# Patient Record
Sex: MALE | Race: WHITE
[De-identification: names, ages, dates, MRNs, and addresses within clinical notes are randomized per-mention and may not be internally consistent; named-entity substitution may affect disease eponyms.]

---

## 2019-02-03 ENCOUNTER — HOSPITAL ENCOUNTER (OUTPATIENT)
Dept: HOSPITAL 92 - ERS | Age: 29
End: 2019-02-03
Attending: SURGERY
Payer: OTHER GOVERNMENT

## 2019-02-03 DIAGNOSIS — Z98.890: ICD-10-CM

## 2019-02-03 DIAGNOSIS — Z88.8: ICD-10-CM

## 2019-02-03 DIAGNOSIS — Z88.4: ICD-10-CM

## 2019-02-03 DIAGNOSIS — K35.33: Primary | ICD-10-CM

## 2019-02-03 DIAGNOSIS — G54.0: ICD-10-CM

## 2019-02-03 DIAGNOSIS — Z90.89: ICD-10-CM

## 2019-02-03 DIAGNOSIS — M72.2: ICD-10-CM

## 2019-02-03 PROCEDURE — 0DTJ4ZZ RESECTION OF APPENDIX, PERCUTANEOUS ENDOSCOPIC APPROACH: ICD-10-PCS | Performed by: SURGERY

## 2019-02-03 PROCEDURE — 96361 HYDRATE IV INFUSION ADD-ON: CPT

## 2019-02-03 PROCEDURE — 96375 TX/PRO/DX INJ NEW DRUG ADDON: CPT

## 2019-02-03 PROCEDURE — 96376 TX/PRO/DX INJ SAME DRUG ADON: CPT

## 2019-02-03 PROCEDURE — 96365 THER/PROPH/DIAG IV INF INIT: CPT

## 2019-02-03 PROCEDURE — 88304 TISSUE EXAM BY PATHOLOGIST: CPT

## 2019-02-03 NOTE — HP
CHIEF COMPLAINT:  Right lower quadrant abdominal pain.



HISTORY OF PRESENT ILLNESS:  This is a 28-year-old male with a 20-hour history of

central abdominal pain, progressive, associated with nausea and vomiting.  No fever.

 Bowels okay.  Now, some right lower quadrant pain. 



PAST MEDICAL HISTORY:  Significant for thoracic outlet syndrome, plantar fasciitis.



PAST SURGICAL HISTORY:  He had a tonsilloadenoidectomy.  He has had shockwave

therapy to his foot.  He has had some kind of orthopedic procedure to the left lower

leg. 



MEDICATIONS:  He is on Adderall.



ALLERGIES:  TO DIPRIVAN AND VERSED CAUSING SEIZURE ACTIVITY.



SOCIAL HISTORY:  He is single.  He is an .  No tobacco.  Rare

alcohol. 



FAMILY HISTORY:  Prostate cancer.



PHYSICAL EXAMINATION:

VITAL SIGNS:  He is afebrile, pulse 68, blood pressure 105/64.  Well-developed,

well-nourished male, in no apparent distress. HEENT:  Unremarkable. 

LUNGS:  Clear. 

HEART:  Regular rate and rhythm. 

ABDOMEN:  Soft.  He is tender in the epigastrium and right lower quadrant. 

EXTREMITIES:  Unremarkable.



LABORATORY DATA:  His white count is 18,000, H and H of 14 and 43, platelet count

260. 



IMAGING STUDIES:  CT scan shows acute appendicitis.



ASSESSMENT:  Acute appendicitis.



PLAN:  Laparoscopic appendectomy.



CONSENT:  I have discussed planned procedure as well as risk of bleeding, infection,

injury to bowel or bladder, need to open.  He understands and gives informed

consent. 







Job ID:  [id number]

## 2019-02-04 NOTE — OP
DATE OF PROCEDURE:  02/03/2019



PREOPERATIVE DIAGNOSIS:  Acute appendicitis.



PROCEDURE PERFORMED:  Laparoscopic appendectomy.



INDICATIONS:  This is a 28-year-old male, with less than 24-hour history of

epigastric pain, migrating to the right lower quadrant with nausea and vomiting.  CT

showing appendicitis. 



FINDINGS:  Acute suppurative nonperforated appendicitis.



DESCRIPTION OF PROCEDURE:  After informed consent was obtained, the patient was

taken to the operating room, given general endotracheal anesthesia, placed in the

supine position.  Abdomen was prepped and draped in usual fashion.  Local anesthesia

was infiltrated subcutaneously and deep, and subumbilical incision was performed.

Subcu divided sharply.  The fascia grasped and 2 stay sutures of 0 Vicryl placed in

each side of midline.  Midline incised.  Digital palpation revealed no local

adhesions.  A blunt 10/12 trocar inserted.  Pneumoperitoneum was created to a

pressure of 15 mmHg.  A 0-degree laparoscope was inserted under direct vision.  Two

5 mm ports were placed; one suprapubically and one in the right lateral abdomen.

The appendix was found.  Mesoappendix was divided with LigaSure.  Base of the

appendix was divided with the linear 45 mm white load stapler.  The appendix was

placed in endosac, removed from the abdomen in the endosac hemostasis assured.

Trocars and retractors were removed.  The fascia was closed with interrupted 0

Vicryl suture.  The skin was closed with interrupted 4-0 Rapide.  Dermabond was

applied.  The patient tolerated the procedure well, transferred to Recovery in good

condition.  Sponge and needle count verified correct x2. 







Job ID:  [id number]

## 2019-02-10 ENCOUNTER — HOSPITAL ENCOUNTER (EMERGENCY)
Dept: HOSPITAL 92 - ERS | Age: 29
Discharge: ANOTHER HOSPITAL | End: 2019-02-10
Payer: OTHER GOVERNMENT

## 2019-02-10 DIAGNOSIS — Z87.891: ICD-10-CM

## 2019-02-10 DIAGNOSIS — F41.9: ICD-10-CM

## 2019-02-10 DIAGNOSIS — F90.9: ICD-10-CM

## 2019-02-10 DIAGNOSIS — Z79.899: ICD-10-CM

## 2019-02-10 DIAGNOSIS — K52.9: Primary | ICD-10-CM

## 2019-02-10 LAB
ALBUMIN SERPL BCG-MCNC: 4.6 G/DL (ref 3.5–5)
ALP SERPL-CCNC: 59 U/L (ref 40–150)
ALT SERPL W P-5'-P-CCNC: 37 U/L (ref 8–55)
ANION GAP SERPL CALC-SCNC: 13 MMOL/L (ref 10–20)
AST SERPL-CCNC: 27 U/L (ref 5–34)
BASOPHILS # BLD AUTO: 0.1 THOU/UL (ref 0–0.2)
BASOPHILS NFR BLD AUTO: 0.6 % (ref 0–1)
BILIRUB SERPL-MCNC: 0.3 MG/DL (ref 0.2–1.2)
BUN SERPL-MCNC: 22 MG/DL (ref 8.9–20.6)
CALCIUM SERPL-MCNC: 10 MG/DL (ref 7.8–10.44)
CHLORIDE SERPL-SCNC: 105 MMOL/L (ref 98–107)
CO2 SERPL-SCNC: 28 MMOL/L (ref 22–29)
CREAT CL PREDICTED SERPL C-G-VRATE: 0 ML/MIN (ref 70–130)
EOSINOPHIL # BLD AUTO: 0.3 THOU/UL (ref 0–0.7)
EOSINOPHIL NFR BLD AUTO: 2.6 % (ref 0–10)
GLOBULIN SER CALC-MCNC: 2.9 G/DL (ref 2.4–3.5)
GLUCOSE SERPL-MCNC: 82 MG/DL (ref 70–105)
HGB BLD-MCNC: 15.2 G/DL (ref 14–18)
LIPASE SERPL-CCNC: 13 U/L (ref 8–78)
LYMPHOCYTES # BLD: 2.9 THOU/UL (ref 1.2–3.4)
LYMPHOCYTES NFR BLD AUTO: 22.3 % (ref 21–51)
MCH RBC QN AUTO: 29.7 PG (ref 27–31)
MCV RBC AUTO: 89.7 FL (ref 78–98)
MONOCYTES # BLD AUTO: 0.7 THOU/UL (ref 0.11–0.59)
MONOCYTES NFR BLD AUTO: 5.4 % (ref 0–10)
NEUTROPHILS # BLD AUTO: 8.9 THOU/UL (ref 1.4–6.5)
NEUTROPHILS NFR BLD AUTO: 69.1 % (ref 42–75)
PLATELET # BLD AUTO: 260 THOU/UL (ref 130–400)
POTASSIUM SERPL-SCNC: 3.6 MMOL/L (ref 3.5–5.1)
RBC # BLD AUTO: 5.1 MILL/UL (ref 4.7–6.1)
SODIUM SERPL-SCNC: 142 MMOL/L (ref 136–145)
SP GR UR STRIP: 1.04 (ref 1–1.04)
WBC # BLD AUTO: 12.9 THOU/UL (ref 4.8–10.8)

## 2019-02-10 PROCEDURE — 96375 TX/PRO/DX INJ NEW DRUG ADDON: CPT

## 2019-02-10 PROCEDURE — 96361 HYDRATE IV INFUSION ADD-ON: CPT

## 2019-02-10 PROCEDURE — 81003 URINALYSIS AUTO W/O SCOPE: CPT

## 2019-02-10 PROCEDURE — 96365 THER/PROPH/DIAG IV INF INIT: CPT

## 2019-02-10 PROCEDURE — 96376 TX/PRO/DX INJ SAME DRUG ADON: CPT

## 2019-02-10 PROCEDURE — 96367 TX/PROPH/DG ADDL SEQ IV INF: CPT

## 2019-02-10 PROCEDURE — 85025 COMPLETE CBC W/AUTO DIFF WBC: CPT

## 2019-02-10 PROCEDURE — 36415 COLL VENOUS BLD VENIPUNCTURE: CPT

## 2019-02-10 PROCEDURE — 83690 ASSAY OF LIPASE: CPT

## 2019-02-10 PROCEDURE — 80053 COMPREHEN METABOLIC PANEL: CPT

## 2019-02-10 PROCEDURE — 74177 CT ABD & PELVIS W/CONTRAST: CPT

## 2019-02-10 NOTE — CT
PRELIMINARY REPORT/[facility]/EMERGENTY AFTER-HOURS PROCEDURE 

 

CT Abdomen and Pelvis With Contrast

 

EXAM DATE/TIME:

2/10/2019 2:04 AM

 

CLINICAL HISTORY:

28 years old, male; Lower abd pain; Prior surgery; Patient 6 days S/P lap appendectomy. Per pt pain w
as very well controlled over the past week, only needed to take a few norcos the first few days. Pt r
eports sudden onset abdominal pain just prior to arrival associated with nausea / vomiting. He

reports chills and sweating yesterday. Denies diarrhea.

 

TECHNIQUE:

Axial computed tomography images of the abdomen and pelvis with intravenous contrast. Coronal reforma
tted images were created and reviewed.

 

COMPARISON:

No relevant prior studies available.

 

FINDINGS:

Lower thorax: No acute findings.

 

ABDOMEN:

Liver: Normal. No mass.

Gallbladder and bile ducts: Normal. No calcified stones. No ductal dilation.

Pancreas: Normal. No ductal dilation.

Spleen: Normal. No splenomegaly.

Adrenals: Normal. No mass.

Kidneys and ureters: Normal. No hydronephrosis.

Stomach and bowel: Wall thickening of the splenic flexure, descending colon, and majority of the sigm
oid colon indicating a colitis. In addition, on coronal images 37-67, multiple mildly distended, flui
d-filled, small bowel loops with minimal wall thickening which may reflect an associated enteritis.

Appendix: Prior appendectomy.

 

PELVIS:

Bladder: Unremarkable as visualized.

Reproductive: Unremarkable as visualized.

 

ABDOMEN and PELVIS:

Intraperitoneal space: Trace free fluid within the right pelvis. No intraperitoneal free air.

Bones/joints: No acute fracture. No dislocation.

Soft tissues: Small fat-containing umbilical hernia.

Vasculature: Normal. No abdominal aortic aneurysm.

Lymph nodes: Normal. No enlarged lymph nodes.

 

IMPRESSION:

1. Wall thickening of the splenic flexure, descending colon, and majority of the sigmoid colon indica
ting a colitis.

2. In addition, on coronal images 37-67, multiple mildly distended, fluid-filled, small bowel loops w
ith minimal wall thickening which may reflect an associated enteritis.

3. Prior appendectomy.

4. No intra-abdominal or pelvic abscess.

 

Thank you for allowing us to participate in the care of your patient.

Dictated and Authenticated by: Humberto Joshi MD

02/10/2019 3:11 AM Central Time ([place] & [place])

 

 

FINAL REPORT 

 

EMERGENT AFTER HOURS CT OF THE ABDOMEN AND PELVIS WITH CONTRAST:

 

FINDINGS/IMPRESSION:

The apparent thickening of the wall of the sigmoid colon and left colon may be secondary to the decom
pressed state rather than pathologic.  No other acute intraabdominal/pelvic findings are identified. 


 

POS: JOY

## 2019-09-07 ENCOUNTER — HOSPITAL ENCOUNTER (EMERGENCY)
Dept: HOSPITAL 92 - ERS | Age: 29
Discharge: HOME | End: 2019-09-07
Payer: OTHER GOVERNMENT

## 2019-09-07 DIAGNOSIS — N20.1: Primary | ICD-10-CM

## 2019-09-07 DIAGNOSIS — F17.210: ICD-10-CM

## 2019-09-07 DIAGNOSIS — Z79.899: ICD-10-CM

## 2019-09-07 DIAGNOSIS — F90.9: ICD-10-CM

## 2019-09-07 DIAGNOSIS — K21.9: ICD-10-CM

## 2019-09-07 DIAGNOSIS — F41.9: ICD-10-CM

## 2019-09-07 LAB
ALBUMIN SERPL BCG-MCNC: 4.6 G/DL (ref 3.5–5)
ALP SERPL-CCNC: 56 U/L (ref 40–150)
ALT SERPL W P-5'-P-CCNC: 14 U/L (ref 8–55)
ANION GAP SERPL CALC-SCNC: 12 MMOL/L (ref 10–20)
AST SERPL-CCNC: 14 U/L (ref 5–34)
BASOPHILS # BLD AUTO: 0 THOU/UL (ref 0–0.2)
BASOPHILS NFR BLD AUTO: 0.1 % (ref 0–1)
BILIRUB SERPL-MCNC: 0.5 MG/DL (ref 0.2–1.2)
BUN SERPL-MCNC: 18 MG/DL (ref 8.9–20.6)
CALCIUM SERPL-MCNC: 9.2 MG/DL (ref 7.8–10.44)
CHLORIDE SERPL-SCNC: 105 MMOL/L (ref 98–107)
CK SERPL-CCNC: 79 U/L (ref 30–200)
CO2 SERPL-SCNC: 23 MMOL/L (ref 22–29)
CREAT CL PREDICTED SERPL C-G-VRATE: 0 ML/MIN (ref 70–130)
EOSINOPHIL # BLD AUTO: 0 THOU/UL (ref 0–0.7)
EOSINOPHIL NFR BLD AUTO: 0.2 % (ref 0–10)
GLOBULIN SER CALC-MCNC: 2.3 G/DL (ref 2.4–3.5)
GLUCOSE SERPL-MCNC: 103 MG/DL (ref 70–105)
HGB BLD-MCNC: 14.5 G/DL (ref 14–18)
LIPASE SERPL-CCNC: 20 U/L (ref 8–78)
LYMPHOCYTES # BLD: 0.7 THOU/UL (ref 1.2–3.4)
LYMPHOCYTES NFR BLD AUTO: 5.3 % (ref 21–51)
MCH RBC QN AUTO: 30.4 PG (ref 27–31)
MCV RBC AUTO: 87.3 FL (ref 78–98)
MONOCYTES # BLD AUTO: 0.5 THOU/UL (ref 0.11–0.59)
MONOCYTES NFR BLD AUTO: 3.7 % (ref 0–10)
NEUTROPHILS # BLD AUTO: 12.4 THOU/UL (ref 1.4–6.5)
NEUTROPHILS NFR BLD AUTO: 90.7 % (ref 42–75)
PLATELET # BLD AUTO: 212 THOU/UL (ref 130–400)
POTASSIUM SERPL-SCNC: 3.9 MMOL/L (ref 3.5–5.1)
RBC # BLD AUTO: 4.76 MILL/UL (ref 4.7–6.1)
RBC UR QL AUTO: (no result) HPF (ref 0–3)
SODIUM SERPL-SCNC: 136 MMOL/L (ref 136–145)
WBC # BLD AUTO: 13.7 THOU/UL (ref 4.8–10.8)
WBC UR QL AUTO: (no result) HPF (ref 0–3)

## 2019-09-07 PROCEDURE — 82550 ASSAY OF CK (CPK): CPT

## 2019-09-07 PROCEDURE — 36415 COLL VENOUS BLD VENIPUNCTURE: CPT

## 2019-09-07 PROCEDURE — 83690 ASSAY OF LIPASE: CPT

## 2019-09-07 PROCEDURE — 85025 COMPLETE CBC W/AUTO DIFF WBC: CPT

## 2019-09-07 PROCEDURE — 81015 MICROSCOPIC EXAM OF URINE: CPT

## 2019-09-07 PROCEDURE — 83605 ASSAY OF LACTIC ACID: CPT

## 2019-09-07 PROCEDURE — 80053 COMPREHEN METABOLIC PANEL: CPT

## 2019-09-07 PROCEDURE — 96361 HYDRATE IV INFUSION ADD-ON: CPT

## 2019-09-07 PROCEDURE — 96374 THER/PROPH/DIAG INJ IV PUSH: CPT

## 2019-09-07 PROCEDURE — 74177 CT ABD & PELVIS W/CONTRAST: CPT

## 2019-09-07 PROCEDURE — 81003 URINALYSIS AUTO W/O SCOPE: CPT

## 2019-09-07 PROCEDURE — 96375 TX/PRO/DX INJ NEW DRUG ADDON: CPT

## 2019-09-07 NOTE — CT
EXAM:

CT abdomen and pelvis with IV contrast



PROVIDED CLINICAL HISTORY:

Left lower quadrant pain



COMPARISON:

2/10/2019



FINDINGS:

The visualized lung bases are free of significant opacity.



There is a 4 mm calculus present in the proximal left ureter with no significant hydronephrosis. Stab
le subcentimeter hypodensities likely reflecting cysts involving right hepatic lobe and right

kidney. The liver, spleen, pancreas, kidneys and adrenal glands appear otherwise unremarkable.



There is no bowel dilatation, inflammatory fat stranding, free fluid or free air apparent. There is n
o evidence for appendicitis.



No regional lymph node enlargement apparent. The regional major vascular structures appear unremarkab
le. The osseous structures demonstrate no concerning lytic or blastic lesions.



IMPRESSION:

4 mm nonobstructing left proximal ureteral calculus.



Reported By: Miguel Price 

Electronically Signed:  9/7/2019 1:39 PM

## 2019-09-24 ENCOUNTER — HOSPITAL ENCOUNTER (EMERGENCY)
Dept: HOSPITAL 92 - ERS | Age: 29
Discharge: HOME | End: 2019-09-24
Payer: OTHER GOVERNMENT

## 2019-09-24 DIAGNOSIS — F41.9: ICD-10-CM

## 2019-09-24 DIAGNOSIS — Z87.891: ICD-10-CM

## 2019-09-24 DIAGNOSIS — F90.9: ICD-10-CM

## 2019-09-24 DIAGNOSIS — K21.9: ICD-10-CM

## 2019-09-24 DIAGNOSIS — N13.2: Primary | ICD-10-CM

## 2019-09-24 DIAGNOSIS — Z79.899: ICD-10-CM

## 2019-09-24 LAB
ALBUMIN SERPL BCG-MCNC: 4.7 G/DL (ref 3.5–5)
ALP SERPL-CCNC: 61 U/L (ref 40–150)
ALT SERPL W P-5'-P-CCNC: 20 U/L (ref 8–55)
ANION GAP SERPL CALC-SCNC: 12 MMOL/L (ref 10–20)
AST SERPL-CCNC: 16 U/L (ref 5–34)
BASOPHILS # BLD AUTO: 0.1 THOU/UL (ref 0–0.2)
BASOPHILS NFR BLD AUTO: 0.5 % (ref 0–1)
BILIRUB SERPL-MCNC: 0.5 MG/DL (ref 0.2–1.2)
BUN SERPL-MCNC: 17 MG/DL (ref 8.9–20.6)
CALCIUM SERPL-MCNC: 9.7 MG/DL (ref 7.8–10.44)
CHLORIDE SERPL-SCNC: 103 MMOL/L (ref 98–107)
CO2 SERPL-SCNC: 25 MMOL/L (ref 22–29)
CREAT CL PREDICTED SERPL C-G-VRATE: 0 ML/MIN (ref 70–130)
EOSINOPHIL # BLD AUTO: 0.1 THOU/UL (ref 0–0.7)
EOSINOPHIL NFR BLD AUTO: 0.9 % (ref 0–10)
GLOBULIN SER CALC-MCNC: 2.5 G/DL (ref 2.4–3.5)
GLUCOSE SERPL-MCNC: 122 MG/DL (ref 70–105)
HGB BLD-MCNC: 15.3 G/DL (ref 14–18)
LYMPHOCYTES # BLD: 1.9 THOU/UL (ref 1.2–3.4)
LYMPHOCYTES NFR BLD AUTO: 17 % (ref 21–51)
MCH RBC QN AUTO: 30.4 PG (ref 27–31)
MCV RBC AUTO: 88.7 FL (ref 78–98)
MONOCYTES # BLD AUTO: 0.5 THOU/UL (ref 0.11–0.59)
MONOCYTES NFR BLD AUTO: 4.8 % (ref 0–10)
MUCOUS THREADS UR QL AUTO: (no result) LPF
NEUTROPHILS # BLD AUTO: 8.3 THOU/UL (ref 1.4–6.5)
NEUTROPHILS NFR BLD AUTO: 76.7 % (ref 42–75)
PLATELET # BLD AUTO: 239 THOU/UL (ref 130–400)
POTASSIUM SERPL-SCNC: 3.3 MMOL/L (ref 3.5–5.1)
PROT UR STRIP.AUTO-MCNC: 100 MG/DL
RBC # BLD AUTO: 5.01 MILL/UL (ref 4.7–6.1)
SODIUM SERPL-SCNC: 137 MMOL/L (ref 136–145)
WBC # BLD AUTO: 10.9 THOU/UL (ref 4.8–10.8)
WBC UR QL AUTO: (no result) HPF (ref 0–3)

## 2019-09-24 PROCEDURE — 81003 URINALYSIS AUTO W/O SCOPE: CPT

## 2019-09-24 PROCEDURE — 96361 HYDRATE IV INFUSION ADD-ON: CPT

## 2019-09-24 PROCEDURE — 96376 TX/PRO/DX INJ SAME DRUG ADON: CPT

## 2019-09-24 PROCEDURE — 96374 THER/PROPH/DIAG INJ IV PUSH: CPT

## 2019-09-24 PROCEDURE — 85025 COMPLETE CBC W/AUTO DIFF WBC: CPT

## 2019-09-24 PROCEDURE — 80053 COMPREHEN METABOLIC PANEL: CPT

## 2019-09-24 PROCEDURE — 83690 ASSAY OF LIPASE: CPT

## 2019-09-24 PROCEDURE — 74018 RADEX ABDOMEN 1 VIEW: CPT

## 2019-09-24 PROCEDURE — 81015 MICROSCOPIC EXAM OF URINE: CPT

## 2019-09-24 PROCEDURE — 96375 TX/PRO/DX INJ NEW DRUG ADDON: CPT

## 2019-09-24 PROCEDURE — 74176 CT ABD & PELVIS W/O CONTRAST: CPT

## 2019-09-24 NOTE — CT
CT Stone  Protocol: 9/24/2019 8:56 AM



HISTORY: Abdominal pain



COMPARISON: 9/7/2019



TECHNIQUE: 

Multiple contiguous axial images were obtained and a CT of the abdomen and pelvis without IV contrast
. Coronal and sagittal reformats were performed.



FINDINGS:

This examination is limited for the evaluation of solid organs and vascular structures due to the lac
k of intravenous contrast.



Lower Chest: within normal limits.



Abdomen:

Liver: within normal limits.

Bile Ducts: Normal caliber.

Gallbladder: Nonspecific thickening of the gallbladder wall.

Pancreas: within normal limits.

Spleen: within normal limits.

Adrenals: within normal limits.

Kidneys: within normal limits.



Pelvis:

Reproductive Organs: No pelvic masses.

Ureters: 4 mm calcification in the mid left ureter with mild left hydronephrosis. No right hydrourete
r or calcifications.

Bladder: within normal limits.



Bowel: Normal caliber.

Mesenteric Lymph Nodes: No enlarged mesenteric lymph nodes.

Peritoneum: No ascites or free air, no fluid collection.

Vessels: Normal caliber aorta 

Retroperitoneum: within normal limits.

Abdominal Wall: within normal limits.

Bones: Unremarkable.  



IMPRESSION:





1. Mid left ureteral calcification with mild left hydronephrosis

2. Gallbladder wall thickening



Reported By: Ernesto Villarreal 

Electronically Signed:  9/24/2019 9:30 AM

## 2019-09-24 NOTE — RAD
XR Abdomen 1 View/KUB



History: Pain



Comparison: CT abdomen and pelvis September 7, 2019



Findings: Mild dextro scoliosis lumbar spine. Phleboliths in the pelvis. No dilated air-filled loops 
of large or small bowel.



Evaluation for free air is limited without an upright exam.



There is a calcification projecting over the expected location left ureter at the level of the left L
3 transverse process.



Impression: Calcification projecting over the expected location left ureter at the level of the L3 tr
ansverse process suggesting a ureteral calculus.



Reported By: Sriram Angel 

Electronically Signed:  9/24/2019 8:46 AM